# Patient Record
Sex: MALE | Race: WHITE | NOT HISPANIC OR LATINO | Employment: FULL TIME | URBAN - METROPOLITAN AREA
[De-identification: names, ages, dates, MRNs, and addresses within clinical notes are randomized per-mention and may not be internally consistent; named-entity substitution may affect disease eponyms.]

---

## 2017-06-26 ENCOUNTER — OFFICE VISIT (OUTPATIENT)
Dept: ORTHOPEDIC SURGERY | Facility: CLINIC | Age: 42
End: 2017-06-26

## 2017-06-26 VITALS
SYSTOLIC BLOOD PRESSURE: 122 MMHG | HEART RATE: 45 BPM | HEIGHT: 68 IN | BODY MASS INDEX: 33.34 KG/M2 | DIASTOLIC BLOOD PRESSURE: 80 MMHG | WEIGHT: 220 LBS

## 2017-06-26 DIAGNOSIS — M25.462 KNEE EFFUSION, LEFT: Primary | ICD-10-CM

## 2017-06-26 DIAGNOSIS — M17.12 PRIMARY OSTEOARTHRITIS OF LEFT KNEE: ICD-10-CM

## 2017-06-26 PROCEDURE — 99203 OFFICE O/P NEW LOW 30 MIN: CPT | Performed by: NURSE PRACTITIONER

## 2017-06-26 PROCEDURE — 20610 DRAIN/INJ JOINT/BURSA W/O US: CPT | Performed by: NURSE PRACTITIONER

## 2017-06-26 RX ORDER — LIDOCAINE HYDROCHLORIDE 10 MG/ML
4 INJECTION, SOLUTION EPIDURAL; INFILTRATION; INTRACAUDAL; PERINEURAL
Status: COMPLETED | OUTPATIENT
Start: 2017-06-26 | End: 2017-06-26

## 2017-06-26 RX ORDER — TRIAMCINOLONE ACETONIDE 40 MG/ML
80 INJECTION, SUSPENSION INTRA-ARTICULAR; INTRAMUSCULAR
Status: COMPLETED | OUTPATIENT
Start: 2017-06-26 | End: 2017-06-26

## 2017-06-26 RX ORDER — BUPIVACAINE HYDROCHLORIDE 5 MG/ML
4 INJECTION, SOLUTION EPIDURAL; INTRACAUDAL
Status: COMPLETED | OUTPATIENT
Start: 2017-06-26 | End: 2017-06-26

## 2017-06-26 RX ADMIN — BUPIVACAINE HYDROCHLORIDE 4 ML: 5 INJECTION, SOLUTION EPIDURAL; INTRACAUDAL at 15:49

## 2017-06-26 RX ADMIN — LIDOCAINE HYDROCHLORIDE 4 ML: 10 INJECTION, SOLUTION EPIDURAL; INFILTRATION; INTRACAUDAL; PERINEURAL at 15:49

## 2017-06-26 RX ADMIN — TRIAMCINOLONE ACETONIDE 80 MG: 40 INJECTION, SUSPENSION INTRA-ARTICULAR; INTRAMUSCULAR at 15:49

## 2017-06-26 NOTE — PROGRESS NOTES
Subjective:     Patient ID: Claus Spence is a 41 y.o. male.    Chief Complaint: left knee pain, left knee effusion    History of Present Illness    Mr. Spence is a 41 y.o male who presents with a 6 month history of left knee pain. Pain present lateral aspect along with presence of swelling, stiffness and decreased range of motion with flexion. Rates pain at 6-7 out of 10, increased with weightbearing activities and with changing of positions such as from sitting to standing and walking. Reports stiffness present in am upon waking. He presented to [facility] and has had x-rays and MRI completed left knee. He has presented with both imaging and reports. He has been taking ibuprofen 800 mg, once or twice daily as needed with significant symptom relief. Denies left knee locking, catching or giving out on him. Denies presence of numbness or tingling at left lower extremity. Denies known injury however played sports as younger person. Denies previously receiving corticosteroid injection left knee. Denies all other concerns present at this time.      Social History     Occupational History   • Not on file.     Social History Main Topics   • Smoking status: Never Smoker   • Smokeless tobacco: Never Used   • Alcohol use No   • Drug use: No   • Sexual activity: Defer      Past Medical History:   Diagnosis Date   • Asthma      History reviewed. No pertinent surgical history.    History reviewed. No pertinent family history.      Review of Systems   Constitutional: Negative for chills, diaphoresis, fever and unexpected weight change.   HENT: Negative for hearing loss, nosebleeds, sore throat and tinnitus.    Eyes: Negative for pain and visual disturbance.   Respiratory: Negative for cough, shortness of breath and wheezing.    Cardiovascular: Negative for chest pain and palpitations.   Gastrointestinal: Negative for abdominal pain, diarrhea, nausea and vomiting.   Endocrine: Negative for cold intolerance, heat  "intolerance and polydipsia.   Genitourinary: Negative for difficulty urinating, dysuria and hematuria.   Musculoskeletal: Positive for arthralgias, joint swelling and myalgias.   Skin: Negative for rash and wound.   Allergic/Immunologic: Negative for environmental allergies.   Neurological: Negative for dizziness, syncope and numbness.   Hematological: Does not bruise/bleed easily.   Psychiatric/Behavioral: Negative for dysphoric mood and sleep disturbance. The patient is not nervous/anxious.            Objective:  Physical Exam    Vital signs reviewed.   General: No acute distress.  Eyes: conjunctiva clear; pupils equally round and reactive  ENT: external ears and nose atraumatic; oropharynx clear  CV: no peripheral edema  Resp: normal respiratory effort  Skin: no rashes or wounds; normal turgor  Psych: mood and affect appropriate; recent and remote memory intact    Vitals:    06/26/17 1527   BP: 122/80   BP Location: Left arm   Pulse: (!) 45   Weight: 220 lb (99.8 kg)   Height: 68\" (172.7 cm)     Last 2 weights    06/26/17  1527   Weight: 220 lb (99.8 kg)     Body mass index is 33.45 kg/(m^2).     Left Knee Exam     Tenderness   The patient is experiencing tenderness in the medial joint line.    Range of Motion   Extension: 0   Flexion: 130     Tests   Lisandro:  Medial - negative Lateral - negative  Lachman:  Anterior - negative    Posterior - negative  Drawer:       Anterior - negative     Posterior - negative  Varus: negative  Valgus: negative  Patellar Apprehension: negative    Other   Erythema: absent  Scars: absent  Sensation: normal  Pulse: present  Swelling: none  Effusion: no effusion present          Imaging:    Reviewed MRI results with patient completed at outside facility:    Impression:  1. Small nonspecific knee joint effusion.  2. Small proximal tibiofibular knee joint effusion, nonspecific  3. Mild quadriceps tendon insertion level tendinopathy.     X-rays left knee previously completed at outside " facility:    Impression: mild central osteoarthritic spurring. No acute findings.     Assessment:       1. Knee effusion, left    2. Primary osteoarthritis of left knee          Plan:  1. Discussed plan of care with patient. Wishes to proceed with corticosteroid injection left knee.  2. Encouraged to continue taking ibuprofen as needed.   3. Discussed quad strengthening activities for him to complete.  4. Will send in prescription for topical pennsaid to apply twice daily as needed for pain. Will plan to see him back in four weeks if not improving. Will plan to discuss visco supplement injections at that time. Patient verbalized understanding of all information and agrees with plan of care. Denies all other concerns present at this time.     BRENDA query complete.  Large Joint Arthrocentesis  Date/Time: 6/26/2017 3:49 PM  Consent given by: patient  Site marked: site marked  Timeout: Immediately prior to procedure a time out was called to verify the correct patient, procedure, equipment, support staff and site/side marked as required   Supporting Documentation  Indications: pain   Procedure Details  Location: knee - L knee  Preparation: Patient was prepped and draped in the usual sterile fashion  Needle size: 22 G  Approach: superior  Medications administered: 4 mL lidocaine PF 1% 1 %; 4 mL bupivacaine (PF) 0.5 %; 80 mg triamcinolone acetonide 40 MG/ML  Patient tolerance: patient tolerated the procedure well with no immediate complications

## 2018-08-24 ENCOUNTER — OFFICE VISIT (OUTPATIENT)
Dept: ORTHOPEDIC SURGERY | Facility: CLINIC | Age: 43
End: 2018-08-24

## 2018-08-24 VITALS — WEIGHT: 220 LBS | HEIGHT: 69 IN | BODY MASS INDEX: 32.58 KG/M2

## 2018-08-24 DIAGNOSIS — M25.562 MECHANICAL PAIN OF LEFT KNEE: ICD-10-CM

## 2018-08-24 DIAGNOSIS — R52 PAIN: Primary | ICD-10-CM

## 2018-08-24 PROCEDURE — 73562 X-RAY EXAM OF KNEE 3: CPT | Performed by: NURSE PRACTITIONER

## 2018-08-24 PROCEDURE — 20610 DRAIN/INJ JOINT/BURSA W/O US: CPT | Performed by: NURSE PRACTITIONER

## 2018-08-24 PROCEDURE — 99212 OFFICE O/P EST SF 10 MIN: CPT | Performed by: NURSE PRACTITIONER

## 2018-08-24 RX ORDER — IBUPROFEN 200 MG
200 TABLET ORAL EVERY 6 HOURS PRN
COMMUNITY
End: 2019-01-14

## 2018-08-24 RX ADMIN — LIDOCAINE HYDROCHLORIDE 4 ML: 20 INJECTION, SOLUTION EPIDURAL; INFILTRATION; INTRACAUDAL; PERINEURAL at 16:23

## 2018-08-24 RX ADMIN — TRIAMCINOLONE ACETONIDE 80 MG: 40 INJECTION, SUSPENSION INTRA-ARTICULAR; INTRAMUSCULAR at 16:23

## 2018-08-24 NOTE — PROGRESS NOTES
Subjective:     Patient ID: Claus Spence is a 42 y.o. male.    Chief Complaint:  Mechanical knee pain, left    History of Present Illness    Mr. Spence is a 42 y.o male who presents back to clinic for follow-up left knee pain. He was last seen by this APRN June 2017, received corticosteroid injection at that time which allowed for pain relief, him to return to running activities for the last one year. Reports approximately 6 months symptom relief with the last steroid injection. He does not run during winter months therefore knee was not painful. Presents today for follow-up. Pain present lateral aspect and patella. Increased pain noted with deep flexion and running. Mild symptom relief with rest. Positive for associated swelling. Denies knee locking, giving away or buckling. Denies presence of numbness or tingling at left lower extremity. Denies known injury however played sports as younger person. Denies all other concerns present at this time.      Social History     Occupational History   • Not on file.     Social History Main Topics   • Smoking status: Never Smoker   • Smokeless tobacco: Never Used   • Alcohol use No   • Drug use: No   • Sexual activity: Defer      Past Medical History:   Diagnosis Date   • Asthma      No past surgical history on file.    No family history on file.      Review of Systems   Constitutional: Negative for chills, diaphoresis, fever and unexpected weight change.   HENT: Negative for hearing loss, nosebleeds, sore throat and tinnitus.    Eyes: Negative for pain and visual disturbance.   Respiratory: Negative for cough, shortness of breath and wheezing.    Cardiovascular: Negative for chest pain and palpitations.   Gastrointestinal: Negative for abdominal pain, diarrhea, nausea and vomiting.   Endocrine: Negative for cold intolerance, heat intolerance and polydipsia.   Genitourinary: Negative for difficulty urinating, dysuria and hematuria.   Musculoskeletal: Positive for arthralgias  "and myalgias. Negative for joint swelling.   Skin: Negative for rash and wound.   Allergic/Immunologic: Negative for environmental allergies.   Neurological: Negative for dizziness, syncope and numbness.   Hematological: Does not bruise/bleed easily.   Psychiatric/Behavioral: Negative for dysphoric mood and sleep disturbance. The patient is not nervous/anxious.            Objective:  Physical Exam    General: No acute distress.  Eyes: conjunctiva clear; pupils equally round and reactive  ENT: external ears and nose atraumatic; oropharynx clear  CV: no peripheral edema  Resp: normal respiratory effort  Skin: no rashes or wounds; normal turgor  Psych: mood and affect appropriate; recent and remote memory intact    Vitals:    08/24/18 1546   Weight: 99.8 kg (220 lb)   Height: 175.3 cm (69\")     1    08/24/18  1546   Weight: 99.8 kg (220 lb)     Body mass index is 32.49 kg/m².     Left Knee Exam     Tenderness   The patient is experiencing tenderness in the patella and lateral joint line.    Range of Motion   Extension: 0   Flexion: 130     Tests   Lisandro:  Medial - negative Lateral - negative  Lachman:  Anterior - 1+    Posterior - negative  Varus: negative  Valgus: negative    Other   Erythema: absent  Sensation: normal  Pulse: present  Swelling: mild  Effusion: effusion present              Imaging:  Left Knee X-Ray  Indication: Pain    AP, Lateral, and Pontoosuc views    Findings:  No fracture  No bony lesion  Normal soft tissues  Patellofemoral joint narrowing    No prior studies were available for comparison.    Assessment:       1. Pain    2. Mechanical pain of left knee          Plan:  1. Discussed plan of care with patient. Since he received significant symptom relief with previous steroid injection and was able to return to all activities, wishes to proceed with steroid injection. If not improving, will see him back to discuss further options. Patient verbalized understanding of all information and agrees with " plan of care. Denies all other concerns present at this time.     Large Joint Arthrocentesis  Date/Time: 8/24/2018 4:23 PM  Consent given by: patient  Site marked: site marked  Timeout: Immediately prior to procedure a time out was called to verify the correct patient, procedure, equipment, support staff and site/side marked as required   Supporting Documentation  Indications: pain   Procedure Details  Location: knee - L knee  Preparation: Patient was prepped and draped in the usual sterile fashion  Needle size: 22 G  Approach: anterolateral  Medications administered: 4 mL lidocaine PF 2% 2 %; 80 mg triamcinolone acetonide 40 MG/ML  Patient tolerance: patient tolerated the procedure well with no immediate complications          Orders:  Orders Placed This Encounter   Procedures   • Large Joint Arthrocentesis   • XR Knee 3 View Left       I ordered and reviewed the BRENDA today.     Dictated utilizing Dragon dictation

## 2018-08-29 PROBLEM — M25.562 MECHANICAL PAIN OF LEFT KNEE: Status: ACTIVE | Noted: 2018-08-29

## 2018-08-29 PROBLEM — M25.562 MECHANICAL KNEE PAIN, LEFT: Status: ACTIVE | Noted: 2017-06-26

## 2018-08-29 RX ORDER — TRIAMCINOLONE ACETONIDE 40 MG/ML
80 INJECTION, SUSPENSION INTRA-ARTICULAR; INTRAMUSCULAR
Status: COMPLETED | OUTPATIENT
Start: 2018-08-24 | End: 2018-08-24

## 2018-08-29 RX ORDER — LIDOCAINE HYDROCHLORIDE 20 MG/ML
4 INJECTION, SOLUTION EPIDURAL; INFILTRATION; INTRACAUDAL; PERINEURAL
Status: COMPLETED | OUTPATIENT
Start: 2018-08-24 | End: 2018-08-24

## 2019-01-14 ENCOUNTER — OFFICE VISIT (OUTPATIENT)
Dept: ORTHOPEDIC SURGERY | Facility: CLINIC | Age: 44
End: 2019-01-14

## 2019-01-14 VITALS — WEIGHT: 230 LBS | HEIGHT: 68 IN | BODY MASS INDEX: 34.86 KG/M2

## 2019-01-14 DIAGNOSIS — M16.12 PRIMARY OSTEOARTHRITIS OF LEFT HIP: ICD-10-CM

## 2019-01-14 DIAGNOSIS — R52 PAIN: Primary | ICD-10-CM

## 2019-01-14 DIAGNOSIS — M17.12 PRIMARY OSTEOARTHRITIS OF LEFT KNEE: ICD-10-CM

## 2019-01-14 PROCEDURE — 73502 X-RAY EXAM HIP UNI 2-3 VIEWS: CPT | Performed by: NURSE PRACTITIONER

## 2019-01-14 PROCEDURE — 20610 DRAIN/INJ JOINT/BURSA W/O US: CPT | Performed by: NURSE PRACTITIONER

## 2019-01-14 PROCEDURE — 99213 OFFICE O/P EST LOW 20 MIN: CPT | Performed by: NURSE PRACTITIONER

## 2019-01-14 RX ORDER — MELOXICAM 15 MG/1
15 TABLET ORAL DAILY
Qty: 30 TABLET | Refills: 3 | Status: SHIPPED | OUTPATIENT
Start: 2019-01-14 | End: 2019-01-18

## 2019-01-14 RX ADMIN — LIDOCAINE HYDROCHLORIDE 8 ML: 20 INJECTION, SOLUTION EPIDURAL; INFILTRATION; INTRACAUDAL; PERINEURAL at 16:23

## 2019-01-14 RX ADMIN — TRIAMCINOLONE ACETONIDE 80 MG: 40 INJECTION, SUSPENSION INTRA-ARTICULAR; INTRAMUSCULAR at 16:23

## 2019-01-14 NOTE — PROGRESS NOTES
Procedure   Large Joint Arthrocentesis: L knee  Date/Time: 1/14/2019 4:23 PM  Site marked: site marked  Timeout: Immediately prior to procedure a time out was called to verify the correct patient, procedure, equipment, support staff and site/side marked as required   Supporting Documentation  Indications: pain   Procedure Details  Location: knee - L knee  Preparation: Patient was prepped and draped in the usual sterile fashion  Needle size: 22 G  Approach: anterolateral  Medications administered: 8 mL lidocaine PF 2% 2 %; 80 mg triamcinolone acetonide 40 MG/ML  Patient tolerance: patient tolerated the procedure well with no immediate complications

## 2019-01-14 NOTE — PROGRESS NOTES
Subjective:     Patient ID: Claus Spence is a 43 y.o. male.    Chief Complaint:  Follow-up left knee pain   History of Present Illness  Claus Spence presents back to clinic for follow-up left knee pain.  Symptoms have worsened over the last 6 months and he is now unable to keep up with coworkers when they are walking for exercise.  Maximal tenderness over the patella and the lateral joint line of the left knee.  Positive for feeling as if the left knee is giving away.  He received corticosteroid injection last visit which was helpful for approximately 2-3 months.  Pain has returned and progressively getting worse.  MRI has been completed and he is continued to apply topical NSAID to the left knee.  He is not currently taking any anti-inflammatory medications.  Increased pain with ambulating long distances and standing for long periods of time.  Rates pain at a 7-8 out of a 10 aching in nature.  He has continued with home strengthening exercises previously discussed.  Denies significant pain radiating to hip, denies pain radiating into groin.  Denies other concerns present site.     Social History     Occupational History   • Not on file   Tobacco Use   • Smoking status: Never Smoker   • Smokeless tobacco: Never Used   Substance and Sexual Activity   • Alcohol use: No   • Drug use: No   • Sexual activity: Defer      Past Medical History:   Diagnosis Date   • Asthma      No past surgical history on file.    No family history on file.      Review of Systems   Constitutional: Negative for chills, diaphoresis, fever and unexpected weight change.   HENT: Negative for hearing loss, nosebleeds, sore throat and tinnitus.    Eyes: Negative for pain and visual disturbance.   Respiratory: Negative for cough, shortness of breath and wheezing.    Cardiovascular: Negative for chest pain and palpitations.   Gastrointestinal: Negative for abdominal pain, diarrhea, nausea and vomiting.   Endocrine: Negative for cold intolerance,  "heat intolerance and polydipsia.   Genitourinary: Negative for difficulty urinating, dysuria and hematuria.   Musculoskeletal: Positive for arthralgias and myalgias. Negative for joint swelling.   Skin: Negative for rash and wound.   Allergic/Immunologic: Negative for environmental allergies.   Neurological: Negative for dizziness, syncope and numbness.   Hematological: Does not bruise/bleed easily.   Psychiatric/Behavioral: Negative for dysphoric mood and sleep disturbance. The patient is not nervous/anxious.            Objective:  Physical Exam    General: No acute distress.  Eyes: conjunctiva clear; pupils equally round and reactive  ENT: external ears and nose atraumatic; oropharynx clear  CV: no peripheral edema  Resp: normal respiratory effort  Skin: no rashes or wounds; normal turgor  Psych: mood and affect appropriate; recent and remote memory intact    Vitals:    01/14/19 1513   Weight: 104 kg (230 lb)   Height: 172.7 cm (68\")         01/14/19  1513   Weight: 104 kg (230 lb)     Body mass index is 34.97 kg/m².      Left Knee Exam     Tenderness   The patient is experiencing tenderness in the lateral joint line and patella.    Range of Motion   Extension: 0   Flexion: 120     Tests   Lisandro:  Medial - negative Lateral - negative  Varus: negative Valgus: negative  Lachman:  Anterior - 1+    Posterior - negative  Patellar apprehension: negative    Other   Erythema: absent  Sensation: normal  Pulse: present  Swelling: mild    Comments:  Positive crepitus throughout arc of motion  Negative active patellar compression test       Left Hip Exam     Tenderness   The patient is experiencing tenderness in the posterior.    Range of Motion   Abduction: 45   Adduction: 25   Extension: 0   Flexion: 90     Tests   Oswaldo: negative    Other   Erythema: absent  Scars: absent  Sensation: normal  Pulse: present    Comments:  Negative logroll exam  negative stinchfield exam                 Imaging:  Left Hip X-Ray  Indication: " Pain  AP and Frog Leg views    Findings:  No fracture  Normal soft tissues  Moderate hip arthrosis     No prior studies were available for comparison.    Assessment:        1. Pain    2. Primary osteoarthritis of left hip    3. Primary osteoarthritis of left knee           Plan:  1.  Discussed plan of care with patient.  Wishes proceed with corticosteroid injection of the left knee.  We'll submit for Visco supplement injections to start within the next 4 weeks.   2.  Discussed that we will start him on a meloxicam once daily encouraged to avoid concurrent use of all NSAIDs including ibuprofen, naproxen, Aleve, aspirin.  He may take Tylenol as needed.  3.  We'll be fitted with a knee brace for support to be used when he is ambulating long distances and exercising.  We'll refer him to sports medicine for corticosteroid injection of ultrasound guided to his left hip.  We'll plan to see him back in approximately 4-5 weeks.  Patient verbalized understanding of all information agrees with plan of care.  Denies all other concerns that he has at this time.  Orders:  Orders Placed This Encounter   Procedures   • XR Hip With or Without Pelvis 2 - 3 View Left   • Ambulatory Referral to Sports Medicine   • Visco Treatment     Dictated utilizing Dragon dictation

## 2019-01-16 ENCOUNTER — TELEPHONE (OUTPATIENT)
Dept: ORTHOPEDIC SURGERY | Facility: CLINIC | Age: 44
End: 2019-01-16

## 2019-01-16 NOTE — TELEPHONE ENCOUNTER
Left message for patient that I have called [employer] and visco gel injections into the knees are not a covered benefit with his [employer] plan (tried orthovisc, euflexxxa and supartz). Ref#[id number] 11/16/2019 @[facility]

## 2019-01-17 ENCOUNTER — TELEPHONE (OUTPATIENT)
Dept: ORTHOPEDIC SURGERY | Facility: CLINIC | Age: 44
End: 2019-01-17

## 2019-01-18 ENCOUNTER — TELEPHONE (OUTPATIENT)
Dept: ORTHOPEDIC SURGERY | Facility: CLINIC | Age: 44
End: 2019-01-18

## 2019-01-18 RX ORDER — NAPROXEN 500 MG/1
500 TABLET ORAL 2 TIMES DAILY
Qty: 60 TABLET | Refills: 0 | Status: SHIPPED | OUTPATIENT
Start: 2019-01-18 | End: 2019-04-18 | Stop reason: SDUPTHER

## 2019-01-18 RX ORDER — TRIAMCINOLONE ACETONIDE 40 MG/ML
80 INJECTION, SUSPENSION INTRA-ARTICULAR; INTRAMUSCULAR
Status: COMPLETED | OUTPATIENT
Start: 2019-01-14 | End: 2019-01-14

## 2019-01-18 RX ORDER — LIDOCAINE HYDROCHLORIDE 20 MG/ML
8 INJECTION, SOLUTION EPIDURAL; INFILTRATION; INTRACAUDAL; PERINEURAL
Status: COMPLETED | OUTPATIENT
Start: 2019-01-14 | End: 2019-01-14

## 2019-01-18 NOTE — TELEPHONE ENCOUNTER
I contacted patient to let him know Tanvi called in Naproxen and not to start taking it until 01/21/19

## 2019-01-28 ENCOUNTER — PROCEDURE VISIT (OUTPATIENT)
Dept: SPORTS MEDICINE | Facility: CLINIC | Age: 44
End: 2019-01-28

## 2019-01-28 VITALS
DIASTOLIC BLOOD PRESSURE: 80 MMHG | HEIGHT: 68 IN | WEIGHT: 237 LBS | SYSTOLIC BLOOD PRESSURE: 128 MMHG | BODY MASS INDEX: 35.92 KG/M2

## 2019-01-28 DIAGNOSIS — M16.12 PRIMARY OSTEOARTHRITIS OF LEFT HIP: Primary | ICD-10-CM

## 2019-01-28 PROCEDURE — 20611 DRAIN/INJ JOINT/BURSA W/US: CPT | Performed by: FAMILY MEDICINE

## 2019-01-28 RX ORDER — TRIAMCINOLONE ACETONIDE 40 MG/ML
80 INJECTION, SUSPENSION INTRA-ARTICULAR; INTRAMUSCULAR ONCE
Status: COMPLETED | OUTPATIENT
Start: 2019-01-28 | End: 2019-01-28

## 2019-01-28 RX ADMIN — TRIAMCINOLONE ACETONIDE 80 MG: 40 INJECTION, SUSPENSION INTRA-ARTICULAR; INTRAMUSCULAR at 16:35

## 2019-01-28 NOTE — PROGRESS NOTES
"Here today for L hip injection requested by GENARO Mccartney.    Ultrasound-Guided Hip Injection Procedure Note    Left hip injection was discussed with the patient in detail, including indication, risks, benefits, and alternatives. Verbal consent was given for the procedure. Injection was performed by physician.  Injection site was identified by ultrasound examination, then cleaned with Betadine and alcohol swabs. Prior to needle insertion, ethyl chloride spray was used for surface anesthesia. Sterile technique was used. Ultrasound guidance was indicated due to proximity of neurovascular structures and injection accuracy.  A 22-gauge, 3.5\" spinal needle was guided to the anterior joint capsule under continuous direct ultrasound visualization. Injectate was seen filing the capsule and passed without difficulty. The needle was removed and a simple bandage was applied. The procedure was tolerated well without difficulty.    Injection mixture:  1% lidocaine without epinephrine: 2 mL  40 mg/mL triamcinolone acetonide: 2 mL    Diagnoses and all orders for this visit:    Primary osteoarthritis of left hip  -     triamcinolone acetonide (KENALOG-40) injection 80 mg; Inject 2 mL into the appropriate joint as directed by provider 1 (One) Time.          "

## 2019-02-25 ENCOUNTER — OFFICE VISIT (OUTPATIENT)
Dept: ORTHOPEDIC SURGERY | Facility: CLINIC | Age: 44
End: 2019-02-25

## 2019-02-25 DIAGNOSIS — M16.12 PRIMARY OSTEOARTHRITIS OF LEFT HIP: ICD-10-CM

## 2019-02-25 DIAGNOSIS — M25.562 MECHANICAL PAIN OF LEFT KNEE: Primary | ICD-10-CM

## 2019-02-25 PROCEDURE — 99212 OFFICE O/P EST SF 10 MIN: CPT | Performed by: NURSE PRACTITIONER

## 2019-04-18 RX ORDER — NAPROXEN 500 MG/1
500 TABLET ORAL 2 TIMES DAILY
Qty: 60 TABLET | Refills: 0 | Status: SHIPPED | OUTPATIENT
Start: 2019-04-18 | End: 2019-07-17 | Stop reason: SDUPTHER

## 2019-07-17 RX ORDER — NAPROXEN 500 MG/1
TABLET ORAL
Qty: 60 TABLET | Refills: 0 | Status: SHIPPED | OUTPATIENT
Start: 2019-07-17 | End: 2020-06-10 | Stop reason: SDUPTHER

## 2019-09-23 ENCOUNTER — PROCEDURE VISIT (OUTPATIENT)
Dept: SPORTS MEDICINE | Facility: CLINIC | Age: 44
End: 2019-09-23

## 2019-09-23 VITALS
HEART RATE: 53 BPM | BODY MASS INDEX: 34.05 KG/M2 | HEIGHT: 68 IN | DIASTOLIC BLOOD PRESSURE: 80 MMHG | WEIGHT: 224.7 LBS | SYSTOLIC BLOOD PRESSURE: 120 MMHG | OXYGEN SATURATION: 99 %

## 2019-09-23 DIAGNOSIS — M25.562 CHRONIC PAIN OF LEFT KNEE: ICD-10-CM

## 2019-09-23 DIAGNOSIS — G89.29 CHRONIC PAIN OF LEFT KNEE: ICD-10-CM

## 2019-09-23 DIAGNOSIS — M16.12 PRIMARY OSTEOARTHRITIS OF LEFT HIP: Primary | ICD-10-CM

## 2019-09-23 PROCEDURE — 99214 OFFICE O/P EST MOD 30 MIN: CPT | Performed by: FAMILY MEDICINE

## 2019-09-23 PROCEDURE — 20611 DRAIN/INJ JOINT/BURSA W/US: CPT | Performed by: FAMILY MEDICINE

## 2019-09-23 RX ORDER — TRIAMCINOLONE ACETONIDE 40 MG/ML
80 INJECTION, SUSPENSION INTRA-ARTICULAR; INTRAMUSCULAR ONCE
Status: COMPLETED | OUTPATIENT
Start: 2019-09-23 | End: 2019-09-23

## 2019-09-23 RX ADMIN — TRIAMCINOLONE ACETONIDE 80 MG: 40 INJECTION, SUSPENSION INTRA-ARTICULAR; INTRAMUSCULAR at 15:58

## 2019-09-25 NOTE — PROGRESS NOTES
"Claus is a 43 y.o. year old male     Chief Complaint   Patient presents with   • Knee Pain     patient is here for left knee pain injection    • Hip Pain     left hip pain  injection        History of Present Illness  HPI   Here today for recurrent left hip pain.  I previously saw him for an injection alone, requested by orthopedic surgery for osteoarthritis.  Today he requests that I evaluate this condition, not just perform the injection.  Thus, it is new for me from an evaluation standpoint.  Complains of chronic left hip pain, deep, worse with movement, aching, moderately severe.  It did improve with intra-articular injection several months ago, but has been gradually returning.  There is associated pain in the left knee, which was previously treated with an intra-articular injection but without a clear diagnosis.    I have reviewed the patient's medical, family, and social history in detail and updated the computerized patient record.    Review of Systems   Constitutional: Negative.    Musculoskeletal: Positive for arthralgias.   Neurological: Negative for weakness and numbness.       /80 (BP Location: Left arm, Patient Position: Sitting, Cuff Size: Adult)   Pulse 53   Ht 172.7 cm (67.99\")   Wt 102 kg (224 lb 11.2 oz)   SpO2 99%   BMI 34.17 kg/m²      Physical Exam    Vital signs reviewed.   General: No acute distress.  Eyes: conjunctiva clear; pupils equally round and reactive  ENT: external ears and nose atraumatic; oropharynx clear  CV: no peripheral edema, 2+ distal pulses  Resp: normal respiratory effort, no use of accessory muscles  Skin: no rashes or wounds; normal turgor  Psych: mood and affect appropriate; recent and remote memory intact  Neuro: sensation to light touch intact    MSK Exam:  Ortho Exam  Left hip: Normal appearance.  There is tenderness palpation anteriorly overlying the joint itself.  There is also positive impingement sign, pain with internal rotation, restricted motion at " "about 10 degrees.  Negative Luz Maria.  Negative straight leg raise.    I was unable to review the x-rays of his hip that were performed earlier this year in the [place] [facility] office, so we repeated a simple AP and lateral view of the left hip in office today without charging the patient.  He does have advanced osteoarthritis of the left hip with joint space narrowing and impingement changes, both cam and pincer.  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    Ultrasound-Guided Hip Injection Procedure Note    Left hip injection was discussed with the patient in detail, including indication, risks, benefits, and alternatives. Verbal consent was given for the procedure. Injection was performed by physician.  Injection site was identified by ultrasound examination, then cleaned with Betadine and alcohol swabs. Prior to needle insertion, ethyl chloride spray was used for surface anesthesia. Sterile technique was used. Ultrasound guidance was indicated due to proximity of neurovascular structures and injection accuracy.  A 22-gauge, 3.5\" spinal needle was guided to the anterior joint capsule under continuous direct ultrasound visualization. Injectate was seen filing the capsule and passed without difficulty. The needle was removed and a simple bandage was applied. The procedure was tolerated well without difficulty.    Injection mixture:  1% lidocaine without epinephrine: 2 mL  40 mg/mL triamcinolone acetonide: 2 mL       Diagnoses and all orders for this visit:    Primary osteoarthritis of left hip  -     triamcinolone acetonide (KENALOG-40) injection 80 mg  -     Ambulatory Referral to Physical Therapy Evaluate and treat    Repeat L hip inj for pain relief.  Check MRI hip to better clarify diagnosis at his age  Follow knee pain -appears to be mechanical secondary to his hip.      EMR Dragon/Transcription disclaimer:    Much of this encounter note is an electronic " transcription/translation of spoken language to printed text.  The electronic translation of spoken language may permit erroneous, or at times, nonsensical words or phrases to be inadvertently transcribed.  Although I have reviewed the note for such errors some may still exist.

## 2019-10-01 ENCOUNTER — TELEPHONE (OUTPATIENT)
Dept: SPORTS MEDICINE | Facility: CLINIC | Age: 44
End: 2019-10-01

## 2020-04-27 ENCOUNTER — OFFICE VISIT (OUTPATIENT)
Dept: SPORTS MEDICINE | Facility: CLINIC | Age: 45
End: 2020-04-27

## 2020-04-27 VITALS — BODY MASS INDEX: 33.95 KG/M2 | WEIGHT: 224 LBS | HEIGHT: 68 IN

## 2020-04-27 DIAGNOSIS — M16.12 PRIMARY OSTEOARTHRITIS OF LEFT HIP: Primary | ICD-10-CM

## 2020-04-27 DIAGNOSIS — M25.552 PAIN OF LEFT HIP JOINT: ICD-10-CM

## 2020-04-27 PROCEDURE — 99213 OFFICE O/P EST LOW 20 MIN: CPT | Performed by: FAMILY MEDICINE

## 2020-04-27 PROCEDURE — 20611 DRAIN/INJ JOINT/BURSA W/US: CPT | Performed by: FAMILY MEDICINE

## 2020-04-27 RX ORDER — TRIAMCINOLONE ACETONIDE 40 MG/ML
40 INJECTION, SUSPENSION INTRA-ARTICULAR; INTRAMUSCULAR ONCE
Status: COMPLETED | OUTPATIENT
Start: 2020-04-27 | End: 2020-04-27

## 2020-04-27 RX ADMIN — TRIAMCINOLONE ACETONIDE 40 MG: 40 INJECTION, SUSPENSION INTRA-ARTICULAR; INTRAMUSCULAR at 14:11

## 2020-04-27 NOTE — PROGRESS NOTES
"Claus is a 44 y.o. year old male follow up on a problem familiar to this examiner.     Chief Complaint   Patient presents with   • Left Hip - Pain, Follow-up   • Osteoarthritis     LT hip OA f/u - injection        History of Present Illness  HPI   Here today for recurrent left hip pain.  Similar to previous, aching, worse with motion, moderately severe.    I have reviewed the patient's medical, family, and social history in detail and updated the computerized patient record.    Review of Systems   Constitutional: Negative.        Ht 172.7 cm (67.99\")   Wt 102 kg (224 lb)   BMI 34.07 kg/m²      Physical Exam    Vital signs reviewed.   General: No acute distress.  Eyes: conjunctiva clear; pupils equally round and reactive  ENT: external ears and nose atraumatic; oropharynx clear  CV: no peripheral edema, 2+ distal pulses  Resp: normal respiratory effort, no use of accessory muscles  Skin: no rashes or wounds; normal turgor  Psych: mood and affect appropriate; recent and remote memory intact  Neuro: sensation to light touch intact    [facility] Exam:  Ortho Exam    Ultrasound-Guided Hip Injection Procedure Note    Left hip injection was discussed with the patient in detail, including indication, risks, benefits, and alternatives. Verbal consent was given for the procedure. Injection was performed by physician.  Injection site was identified by ultrasound examination, then cleaned with Betadine and alcohol swabs. Prior to needle insertion, ethyl chloride spray was used for surface anesthesia. Sterile technique was used. Ultrasound guidance was indicated due to proximity of neurovascular structures and injection accuracy.  A 22-gauge, 3.5\" spinal needle was guided to the anterior joint capsule under continuous direct ultrasound visualization. Injectate was seen filing the capsule and passed without difficulty. The needle was removed and a simple bandage was applied. The procedure was tolerated well without " difficulty.    Injection mixture:  1% lidocaine without epinephrine: 2 mL  40 mg/mL triamcinolone acetonide: 1 mL       Diagnoses and all orders for this visit:    Primary osteoarthritis of left hip  -     triamcinolone acetonide (KENALOG-40) injection 40 mg  -     MRI Hip Left Without Contrast; Future    Pain of left hip joint  -     triamcinolone acetonide (KENALOG-40) injection 40 mg  -     MRI Hip Left Without Contrast; Future    Repeat injection today tolerated well.  Plan MRI to clarify the underlying pathology leading to his early onset arthritis, likely will need to consider surgical treatment.      EMR Dragon/Transcription disclaimer:    Much of this encounter note is an electronic transcription/translation of spoken language to printed text.  The electronic translation of spoken language may permit erroneous, or at times, nonsensical words or phrases to be inadvertently transcribed.  Although I have reviewed the note for such errors some may still exist.

## 2020-05-12 ENCOUNTER — HOSPITAL ENCOUNTER (OUTPATIENT)
Dept: MRI IMAGING | Facility: HOSPITAL | Age: 45
Discharge: HOME | End: 2020-05-12
Admitting: FAMILY MEDICINE

## 2020-05-12 DIAGNOSIS — M16.12 PRIMARY OSTEOARTHRITIS OF LEFT HIP: ICD-10-CM

## 2020-05-12 DIAGNOSIS — M25.552 PAIN OF LEFT HIP JOINT: ICD-10-CM

## 2020-05-12 PROCEDURE — 73721 MRI JNT OF LWR EXTRE W/O DYE: CPT

## 2020-06-10 ENCOUNTER — TELEPHONE (OUTPATIENT)
Dept: SPORTS MEDICINE | Facility: CLINIC | Age: 45
End: 2020-06-10

## 2020-06-10 RX ORDER — NAPROXEN 500 MG/1
500 TABLET ORAL 2 TIMES DAILY
Qty: 60 TABLET | Refills: 0 | Status: SHIPPED | OUTPATIENT
Start: 2020-06-10 | End: 2020-08-10

## 2020-06-10 NOTE — TELEPHONE ENCOUNTER
Pt states he was referred over from zafar mcintyre and she prescribed him naproxin. He did not know if he would be getting that from her or you please advise refill. AIMEJ

## 2020-08-10 RX ORDER — NAPROXEN 500 MG/1
TABLET ORAL
Qty: 60 TABLET | Refills: 0 | Status: SHIPPED | OUTPATIENT
Start: 2020-08-10

## 2020-08-19 ENCOUNTER — TELEPHONE (OUTPATIENT)
Dept: SPORTS MEDICINE | Facility: CLINIC | Age: 45
End: 2020-08-19

## 2020-08-19 NOTE — TELEPHONE ENCOUNTER
Patient called in requesting all of his medical records. I attempted to return call to patient, no answer when called, and unable to leave a vm due to vm box being full.   Patient needs to contact medical records at [phone] to get full records.     Thanks  sandie

## 2021-04-27 NOTE — TELEPHONE ENCOUNTER
I spoke with Tanvi about this.  I told him to stop taking the meloxacam ASAP. Not to take any pain medication for 3 days and to contact us if he did not get better with the BP and redness. I will document his allergy to it.   Detail Level: Detailed

## 2022-05-24 NOTE — TELEPHONE ENCOUNTER
Called Claus to follow up, Dr. Tenorio's plan was to wait a week or two so the fluid from the injection does not affect the reading. MRI was placed and he should be receiving a call soon to schedule.    show